# Patient Record
Sex: FEMALE | URBAN - METROPOLITAN AREA
[De-identification: names, ages, dates, MRNs, and addresses within clinical notes are randomized per-mention and may not be internally consistent; named-entity substitution may affect disease eponyms.]

---

## 2022-04-29 ENCOUNTER — APPOINTMENT (RX ONLY)
Dept: URBAN - METROPOLITAN AREA CLINIC 5 | Facility: CLINIC | Age: 32
Setting detail: DERMATOLOGY
End: 2022-04-29

## 2022-04-29 ENCOUNTER — RX ONLY (OUTPATIENT)
Age: 32
Setting detail: RX ONLY
End: 2022-04-29

## 2022-04-29 DIAGNOSIS — L29.89 OTHER PRURITUS: ICD-10-CM

## 2022-04-29 DIAGNOSIS — L24 IRRITANT CONTACT DERMATITIS: ICD-10-CM

## 2022-04-29 DIAGNOSIS — L29.8 OTHER PRURITUS: ICD-10-CM

## 2022-04-29 PROBLEM — L24.9 IRRITANT CONTACT DERMATITIS, UNSPECIFIED CAUSE: Status: ACTIVE | Noted: 2022-04-29

## 2022-04-29 PROCEDURE — ? RECOMMENDATIONS

## 2022-04-29 PROCEDURE — ? PRESCRIPTION

## 2022-04-29 PROCEDURE — 99203 OFFICE O/P NEW LOW 30 MIN: CPT

## 2022-04-29 PROCEDURE — ? COUNSELING

## 2022-04-29 PROCEDURE — ? PRESCRIPTION MEDICATION MANAGEMENT

## 2022-04-29 RX ORDER — TRIAMCINOLONE ACETONIDE 1 MG/G
THIN LAYER CREAM TOPICAL QAM
Qty: 454 | Refills: 4 | Status: ERX | COMMUNITY
Start: 2022-04-29

## 2022-04-29 RX ORDER — MOMETASONE FUROATE 1 MG/G
1 CREAM TOPICAL BID
Qty: 45 | Refills: 0 | Status: CANCELLED
Stop reason: CLARIF

## 2022-04-29 RX ORDER — MOMETASONE FUROATE 1 MG/ML
SOLUTION TOPICAL
Qty: 60 | Refills: 10 | Status: ERX | COMMUNITY
Start: 2022-04-29

## 2022-04-29 RX ORDER — METHYLPREDNISOLONE 4 MG/1
1 TABLET ORAL AS DIRECTED
Qty: 1 | Refills: 1 | Status: ERX | COMMUNITY
Start: 2022-04-29

## 2022-04-29 RX ADMIN — METHYLPREDNISOLONE 1: 4 TABLET ORAL at 00:00

## 2022-04-29 RX ADMIN — TRIAMCINOLONE ACETONIDE THIN LAYER: 1 CREAM TOPICAL at 00:00

## 2022-04-29 ASSESSMENT — LOCATION SIMPLE DESCRIPTION DERM: LOCATION SIMPLE: LEFT FOREARM

## 2022-04-29 ASSESSMENT — LOCATION ZONE DERM: LOCATION ZONE: ARM

## 2022-04-29 ASSESSMENT — LOCATION DETAILED DESCRIPTION DERM: LOCATION DETAILED: LEFT VENTRAL PROXIMAL FOREARM

## 2022-04-29 NOTE — PROCEDURE: RECOMMENDATIONS
Recommendations (Free Text): Recommended Zyrtec 10 mg twice daily\\nBenadryl as needed\\nCetaphil cream moisturizer and  \\nDove sensitive bar\\nRecommended to see an allergist for allergy testing
Detail Level: Zone
Recommendation Preamble: The following recommendations were made during the visit:
Render Risk Assessment In Note?: no

## 2022-04-29 NOTE — HPI: ITCHING
What Type Of Note Output Would You Prefer (Optional)?: Standard Output
On A Scale Of 0 To 10 How Would You Rate Your Itching?: 9
How Did Your Itching Occur?: sudden in onset (over a period of weeks to a few months)
How Severe Is Your Itching?: moderate
Additional History: Patient states she moved her a year ago and got a dog and has started itching all over.

## 2023-06-15 NOTE — PROCEDURE: PRESCRIPTION MEDICATION MANAGEMENT
154
Detail Level: Zone
Render In Strict Bullet Format?: No
Initiate Treatment: triamcinolone acetonide 0.1 % topical cream QAM\\nQuantity: 454.0 g  Days Supply: 30\\nSig: apply to affected itchy areas of body bid prn up to 14 days per month maximum. do not use on face\\n\\nMedrol (Crescencio) 4 mg tablets in a dose pack As directed\\nQuantity: 1.0 Blister  Days Supply: 6\\nSig: Take as directed\\n\\nmometasone 0.1 % topical cream Bid\\nQuantity: 45.0 g  Days Supply: 30\\nSig: Apply to scalp twice daily